# Patient Record
Sex: FEMALE | Race: WHITE
[De-identification: names, ages, dates, MRNs, and addresses within clinical notes are randomized per-mention and may not be internally consistent; named-entity substitution may affect disease eponyms.]

---

## 2018-06-07 ENCOUNTER — HOSPITAL ENCOUNTER (OUTPATIENT)
Dept: HOSPITAL 52 - LL.SDS | Age: 68
Discharge: HOME | End: 2018-06-07
Attending: SURGERY
Payer: MEDICARE

## 2018-06-07 VITALS — DIASTOLIC BLOOD PRESSURE: 50 MMHG | SYSTOLIC BLOOD PRESSURE: 120 MMHG

## 2018-06-07 DIAGNOSIS — E66.9: ICD-10-CM

## 2018-06-07 DIAGNOSIS — D64.9: Primary | ICD-10-CM

## 2018-06-07 DIAGNOSIS — R19.4: ICD-10-CM

## 2018-06-07 DIAGNOSIS — R10.30: ICD-10-CM

## 2018-06-07 DIAGNOSIS — I10: ICD-10-CM

## 2018-06-07 DIAGNOSIS — Z79.4: ICD-10-CM

## 2018-06-07 DIAGNOSIS — K21.9: ICD-10-CM

## 2018-06-07 DIAGNOSIS — K57.30: ICD-10-CM

## 2018-06-07 DIAGNOSIS — K63.89: ICD-10-CM

## 2018-06-07 DIAGNOSIS — Z79.899: ICD-10-CM

## 2018-06-07 DIAGNOSIS — E78.5: ICD-10-CM

## 2018-06-07 DIAGNOSIS — E11.42: ICD-10-CM

## 2018-06-07 NOTE — PCM.OPNOTE
- General Post-Op/Procedure Note


Date of Surgery/Procedure: 06/07/18


Operative Procedure(s): colonoscopy with biopsy


Findings: 





extensive sigmoid diverticulosis without acute inflammation


Otherwise normal appearing colon


Pre Op Diagnosis: anemia.  Change in bowel habits


Post-Op Diagnosis: Diverticulosis


Anesthesia Technique: MAC


Primary Surgeon: Matt London


Pathology: 





biopsies of right and left colon


Output, Urine Amount: 0


EBL in mLs: 3


Complications: None


Condition: Good


Free Text/Narrative:: 


 Intake & Output











 06/07/18 06/07/18 06/07/18





 06:59 14:59 22:59


 


Intake Total  900 


 


Balance  900

## 2018-06-07 NOTE — PCM.PN
- General Info


Date of Service: 06/07/18





- Review of Systems


Systems Review Comment:: 





68-year-old female referred for colonoscopy. She has unexplained anemia. She is 

also had recent upper endoscopy which did not reveal a source of bleeding. She 

has noted a recent change in bowel pattern with more loose stools and lower 

abdominal pain which worsens throughout the day.Her past medical history is 

significant for breast cancer. She is medically stable to proceed today. I have 

discussed the proposed colonoscopy with the patient. Risks such as but not 

limited to bleeding and GI injury reviewed. She appears to understand and 

agrees to proceed.





- Patient Data


Vitals - Most Recent: 


 Last Vital Signs











Temp  98.1 F   06/07/18 13:25


 


Pulse  80   06/07/18 13:25


 


Resp  16   06/07/18 13:25


 


BP  136/64   06/07/18 13:25


 


Pulse Ox  99   06/07/18 13:25











Weight - Most Recent: 94.347 kg


Med Orders - Current: 


 Current Medications





Lactated Ringer's (Ringers, Lactated)  1,000 mls @ 125 mls/hr IV ASDIRECTED RUPINDER


   Last Admin: 06/07/18 13:48 Dose:  125 mls/hr


Sodium Chloride (Saline Flush)  10 ml FLUSH ASDIRECTED PRN


   PRN Reason: Keep Vein Open





Discontinued Medications





Fentanyl (Sublimaze) Confirm Administered Dose 100 mcg .ROUTE .STK-MED ONE


   Stop: 06/07/18 14:15


Midazolam HCl (Versed 1 Mg/Ml) Confirm Administered Dose 2 mg .ROUTE .STK-MED 

ONE


   Stop: 06/07/18 14:15


Propofol (Diprivan  20 Ml) Confirm Administered Dose 200 mg .ROUTE .STK-MED ONE


   Stop: 06/07/18 14:15











- Problem List Review


Problem List Initiated/Reviewed/Updated: Yes





- Assessment


Assessment:: 





anemia


Change in bowel habits





- Plan


Plan:: 





colonoscopy

## 2018-06-08 NOTE — OR
Date of Procedure:  06/07/2018

 

PREOPERATIVE DIAGNOSIS:  Anemia.

 

POSTOPERATIVE DIAGNOSIS:  Diverticulosis.

 

OPERATIONS PERFORMED:  Colonoscopy with biopsy.

 

INDICATIONS FOR SURGERY:  This 68-year-old female was noted to have unexplained

anemia with recent upper endoscopy which did not show bleeding site.  She has

also had some lower abdominal pain.  The patient was referred for a diagnostic

colonoscopy.

 

FINDINGS:  No obvious source of chronic bleeding was seen during today's exam.

The patient had extensive sigmoid diverticulosis with some tortuosity of the

sigmoid colon; however, this does not appear to be acutely inflamed at this

time.  The remainder of the colon mucosa appeared normal.  There were no visible

signs of inflammation, ulceration, or evidence of bleeding at this time.

 

DESCRIPTION OF PROCEDURE:  The patient was taken to the operating room.  She was

given intravenous sedation, and with her in the left lateral decubitus position,

digital rectal exam was performed showing no rectal masses.  The Olympus

colonoscope was inserted into the rectum.  Retroflexed examination of the rectal

canal was performed.  The scope was then carefully advanced under direct

visualization through the entire length of the colon until the cecum was

reached.  It was somewhat difficult through the sigmoid region, but eventually,

the colon was able to be traversed and the cecum reached.  Cecal acquisition was

confirmed by noting the normal internal cecal anatomy including the appendiceal

orifice and ileocecal valve.  After examining the cecum, the scope was slowly

withdrawn sequentially re-examining the colonic segments.  During withdrawal of

the scope, random biopsies were taken from the left and right sides of the colon

to evaluate the patient's diarrhea symptoms.  After the colon had been

completely examined and with no sign of any complication, the scope was removed

and the patient was taken from the operating room in satisfactory condition.

 

ESTIMATED BLOOD LOSS:  3 mL.

 

COMPLICATIONS:  None.

 

PROGNOSIS:  Good.

 

COREY London MD

DD:  06/07/2018 15:57:57

DT:  06/07/2018 18:19:41

Job #:  [id number]

## 2019-09-18 ENCOUNTER — HOSPITAL ENCOUNTER (OUTPATIENT)
Dept: HOSPITAL 7 - FB.SDS | Age: 69
Discharge: HOME | End: 2019-09-18
Attending: NURSE ANESTHETIST, CERTIFIED REGISTERED
Payer: MEDICARE

## 2019-09-18 DIAGNOSIS — G57.12: Primary | ICD-10-CM

## 2019-09-18 DIAGNOSIS — Z79.899: ICD-10-CM

## 2019-09-18 DIAGNOSIS — Z79.4: ICD-10-CM

## 2019-09-18 PROCEDURE — 82962 GLUCOSE BLOOD TEST: CPT

## 2019-09-18 PROCEDURE — 64450 NJX AA&/STRD OTHER PN/BRANCH: CPT

## 2019-09-18 NOTE — US
INDICATION:  Left lateral femoral cutaneous nerve block.



ULTRASOUND FOR RFA GUIDANCE:  Multiple ultrasonic images were obtained for 
guidance for RFA.  A needle was noted at the left lateral cutaneous femoral 
nerve.  

MTDD

## 2019-09-18 NOTE — PCM.SN
- Free Text/Narrative


Note: 





ANESTHESIA CHRONIC PAIN SERVICE








Date: 09/18/2019


Time: 1102 to 1142








Preprocedure Dx: Meralgia Paresthetica Lt Lateral Upper Thigh [G57.12]


Postprocedure Rx: Left Lateral Cutaneous Femoral Nerve Block.





Procedure: Left Lateral Cutaneous Femoral Nerve [Lcfn] Block with Ultrasound [U/

S] Guidance





This patient was referred to anesthesia by Dr. STEFANIA Edwards D.OGrover for left Lcfn 

Block for left lateral hip pain.  She is s/p lower lumbar laminectomy with 

chronic left hip and knee pain. She is Diabetic Type 2 with insulin.  Her Accu 

check today was under 150.  Upon asking her her pain today, she rated it a 8-9 

/ 10.  I really tried to localize the pain.  It does not radiate down her left 

upper lateral thigh but hurts around her lateral Iliac Crest area.  Little or 

no pain radiating into her lower left buttocks.  It was really hard to pin down 

her site pain.





I discussed her pain with her and informed her that Dr. STEFANIA Edwards would like us 

to try this nerve block.  I told her it was not a true "classic" presentation 

but we would do this but to be aware of no relief.  She wishes to proceed and a 

consent was obtained.  I also told her to watch her blood sugars and to contact 

her PCP if she had problems after 2 days.





Monitors: SpO2 with heart rate [please see nursing notes for the vital signs]. 

    Sedation: None.     She remained awake throughout this procedure without 

complaints.





She is supine with her H.O.B. elevated 30 degrees.  A Preprocedure U/S scan was 

done locating the Left Femoral Artery and Nerve following laterally locating 

the Left Sartorious Muscle with continuing laterally locating what appeared to 

be the Left Lcfn.  A wide prep area was done with a Chlora-Prep sponge and 

allowed to dry.  Using aseptic technique, I infiltrated the block needle 

insertion site with 3 ml's of 1% Lidocaine Plain using a 30 Ga. needle.  Under 

direct U/S visualization, I inserted and advanced a 20Ga. 4 In. Stimuplex Ultra 

360 Echogenic Needle to what appeared to be a good spot with mild difficulty [

huge panniculus].  Upon a 1 ml injection, it did not appear to be where I 

wanted it.  I then stopped and rescanned for the Lcfn which was done with 

moderate difficulty.  It was easy to follow the Sartorious until the end of the 

muscle which finding the Lcfn was problematic.





After what appeared to be the left Lcfn, under direct U/S visualization, a 

total of 8 ml's of .5% Naropin plain and 2 ml's of Dexamethasone was injected 

slowly with frequent aspirations lifting the space and nerve.  She tolerated 

this procedure quite well with no complaints or complications noted.





After about 10 minutes or more, she was discharged home walking with a pain 

scale of 4 / 10.  I discussed with her again if she would like another 

injection after 1 week we could do it.  She appeared very happy and laughing 

while walking.





Documentation: Please see the images taken in Radiology PAC system.





Thank you for using our service.





JALIL Michaels CRNA

## 2019-11-14 ENCOUNTER — HOSPITAL ENCOUNTER (OUTPATIENT)
Dept: HOSPITAL 52 - LL.SDS | Age: 69
Discharge: HOME | End: 2019-11-14
Attending: SURGERY
Payer: MEDICARE

## 2019-11-14 VITALS — HEART RATE: 78 BPM | DIASTOLIC BLOOD PRESSURE: 66 MMHG | SYSTOLIC BLOOD PRESSURE: 123 MMHG

## 2019-11-14 DIAGNOSIS — Z79.82: ICD-10-CM

## 2019-11-14 DIAGNOSIS — E11.9: ICD-10-CM

## 2019-11-14 DIAGNOSIS — I10: ICD-10-CM

## 2019-11-14 DIAGNOSIS — Z79.899: ICD-10-CM

## 2019-11-14 DIAGNOSIS — G56.02: Primary | ICD-10-CM

## 2019-11-14 DIAGNOSIS — Z79.4: ICD-10-CM

## 2019-11-14 PROCEDURE — 01830 ANES ARTHR/NDSC WRST/HND NOS: CPT

## 2019-11-14 PROCEDURE — 64721 CARPAL TUNNEL SURGERY: CPT

## 2019-11-14 PROCEDURE — 82962 GLUCOSE BLOOD TEST: CPT

## 2019-11-14 NOTE — PCM.HPR
H & P Addendum review





- H & P Addendum Review


Date of Original H & P: 10/31/19


Date Reviewed: 11/14/19


Time Reviewed: 08:59


Patient was Examined: No Changes

## 2019-11-14 NOTE — PCM.OPNOTE
- General Post-Op/Procedure Note


Date of Surgery/Procedure: 11/14/19


Operative Procedure(s): L CTR


Pre Op Diagnosis: L CTS


Post-Op Diagnosis: Same


Anesthesia Technique: Local, MAC


Primary Surgeon: Thomas J Mohs


Anesthesia Provider: Radha Fall


Complications: None


Condition: Good

## 2019-11-14 NOTE — OR
Date of Procedure:  11/14/2019

 

PREOPERATIVE DIAGNOSIS:  Left carpal tunnel syndrome.

 

POSTOPERATIVE DIAGNOSIS:  Left carpal tunnel syndrome.

 

PROCEDURE:  Left carpal tunnel release.

 

ANESTHESIA:  Local with IV sedation.

 

DESCRIPTION OF PROCEDURE:  The patient was brought to the procedure room where

IV sedation was administered.  Her left upper extremity was exsanguinated and

tourniquet inflated.  Her hand was prepped and draped sterilely.  3 mL of 1%

lidocaine were injected in the palmar crease.  A routine incision was made in

the skin crease over the transverse carpal ligament and extended through the

subcutaneous tissue and palmar aponeurosis.  The transverse carpal ligament was

identified and sharply incised until the median nerve was visible.  The ligament

was then split distally into the palm and then proximally into the wrist.

Finger palpation and inspection revealed the constricting band to be completely

released.  The wound was irrigated and the skin closed with interrupted 4-0

Prolene vertical mattress sutures.  Antibiotic ointment and a sterile bulky

pressure dressing were applied.  The patient tolerated the procedure well and

returned to Recovery in stable condition.

 

BLOOD LOSS:  None.

 

MODL THOMAS J MOHS, MD

DD:  11/14/2019 09:43:28

DT:  11/14/2019 10:10:56

Job #:  [id number]

## 2020-01-09 ENCOUNTER — HOSPITAL ENCOUNTER (OUTPATIENT)
Dept: HOSPITAL 52 - LL.SDS | Age: 70
Discharge: HOME | End: 2020-01-09
Attending: SURGERY
Payer: MEDICARE

## 2020-01-09 VITALS — HEART RATE: 82 BPM | SYSTOLIC BLOOD PRESSURE: 144 MMHG | DIASTOLIC BLOOD PRESSURE: 79 MMHG

## 2020-01-09 DIAGNOSIS — Z79.82: ICD-10-CM

## 2020-01-09 DIAGNOSIS — L02.31: ICD-10-CM

## 2020-01-09 DIAGNOSIS — I10: ICD-10-CM

## 2020-01-09 DIAGNOSIS — G56.01: Primary | ICD-10-CM

## 2020-01-09 DIAGNOSIS — E66.9: ICD-10-CM

## 2020-01-09 DIAGNOSIS — Z79.4: ICD-10-CM

## 2020-01-09 DIAGNOSIS — Z79.899: ICD-10-CM

## 2020-01-09 DIAGNOSIS — E11.9: ICD-10-CM

## 2020-01-09 RX ADMIN — BACITRACIN ZINC ONE DOSE: 500 OINTMENT TOPICAL at 09:33

## 2020-01-09 NOTE — PCM.HPR
H & P Addendum review





- H & P Addendum Review


Date of Original H & P: 12/31/19


Date Reviewed: 01/09/20


Time Reviewed: 09:05


Patient was Examined: No Changes

## 2020-01-09 NOTE — PCM.OPNOTE
- General Post-Op/Procedure Note


Date of Surgery/Procedure: 01/09/20


Operative Procedure(s): R CTR


Pre Op Diagnosis: R CTS


Post-Op Diagnosis: Same


Anesthesia Technique: Local, MAC


Primary Surgeon: Thomas J Mohs EBL in mLs: 0


Complications: None


Condition: Good

## 2020-01-09 NOTE — OR
Date of Procedure:  01/09/2020

 

PREOPERATIVE DIAGNOSIS:  Right carpal tunnel syndrome.

 

POSTOPERATIVE DIAGNOSIS:  Right carpal tunnel syndrome.

 

PROCEDURE:  Right carpal tunnel release.

 

ANESTHESIA:  Local MAC.

 

DESCRIPTION OF PROCEDURE:  The patient was brought to the procedure room where

she was placed in the supine position and IV sedation administered.  Right hand

and forearm were exsanguinated and tourniquet inflated.  Hand and forearm were

prepped with ChloraPrep and draped sterilely.  2 mL of 1% lidocaine was

infiltrated in the palmar crease.  An incision was made in the crease over the

transverse carpal ligament and extended through the subcutaneous tissue and

palmar aponeurosis until the transverse carpal ligament was identified.  This

was sharply incised until the median nerve was visible.  The ligament was split

distally into the palm and then proximally into the wrist.  Finger palpation and

inspection revealed all constricting bands to be released.  The wound was closed

with interrupted 4-0 Prolene vertical mattress sutures.  Antibiotic ointment and

a bulky sterile pressure dressing were applied.  The patient tolerated the

procedure well and returned to Recovery in stable condition.

 

MODL THOMAS J MOHS, MD

DD:  01/09/2020 09:46:17

DT:  01/09/2020 11:45:09

Job #:  [id number]

## 2021-05-25 ENCOUNTER — HOSPITAL ENCOUNTER (EMERGENCY)
Dept: HOSPITAL 52 - LL.ED | Age: 71
Discharge: HOME | End: 2021-05-25
Payer: MEDICARE

## 2021-05-25 VITALS — DIASTOLIC BLOOD PRESSURE: 78 MMHG | SYSTOLIC BLOOD PRESSURE: 159 MMHG | HEART RATE: 75 BPM

## 2021-05-25 DIAGNOSIS — S01.81XA: Primary | ICD-10-CM

## 2021-05-25 DIAGNOSIS — T81.30XA: ICD-10-CM
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
## 2021-05-25 DIAGNOSIS — E11.9: ICD-10-CM

## 2021-05-25 DIAGNOSIS — K21.9: ICD-10-CM

## 2021-05-25 DIAGNOSIS — E78.00: ICD-10-CM

## 2021-05-25 DIAGNOSIS — Z79.82: ICD-10-CM

## 2021-05-25 DIAGNOSIS — E66.9: ICD-10-CM

## 2021-05-25 DIAGNOSIS — Z79.4: ICD-10-CM

## 2021-05-25 DIAGNOSIS — Z79.899: ICD-10-CM

## 2021-05-25 DIAGNOSIS — I10: ICD-10-CM
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
## 2021-05-25 NOTE — EDM.PDOC
ED HPI GENERAL MEDICAL PROBLEM





- General


Chief Complaint: Laceration


Stated Complaint: Fall at home, Surgical Incision reopened


Time Seen by Provider: 05/25/21 09:01


Source of Information: Reports: Patient


History Limitations: Reports: No Limitations





- History of Present Illness


INITIAL COMMENTS - FREE TEXT/NARRATIVE: 





Pt. presents to ER with complaints of dehiscence of surgical incision of L knee 

and facial laceration post fall. Pt. states that she fell at home today, which 

caused the incision from a recent knee replacement to open. She also stuck her 

face. causing a laceration to her chin. Pt. states that she did not feel dizzy 

or lightheaded prior to the fall. Denies any chest pain, shortness of breath, 

headache.


Pt. tetanus is up to date.


Pt. states that she is not experiencing any johanna or deep pain in he knee, and 

she states that she is able to bear weight. She only complains of superficial 

pain associated with the soft tissue injury.


Onset: Today


Location: Reports: Face, Lower Extremity, Left


Associated Symptoms: Denies: Confusion, Chest Pain, Cough, Diaphoresis, 

Fever/Chills, Headaches, Malaise, Nausea/Vomiting, Seizure, Shortness of Breath,

Syncope, Weakness





- Related Data


                                    Allergies











Allergy/AdvReac Type Severity Reaction Status Date / Time


 


No Known Allergies Allergy   Verified 05/25/21 09:26











Home Meds: 


                                    Home Meds





Lisinopril 2.5 mg PO BEDTIME 11/28/16 [History]


Zolpidem Tartrate [Ambien Cr] 12.5 mg PO BEDTIME 11/28/16 [History]


DULoxetine [Cymbalta] 60 mg PO DAILY 11/29/16 [History]


Insulin Aspart [Novolog Flexpen] 40 unit SQ QAM 11/29/16 [History]


atorvaSTATin [Lipitor] 20 mg PO BEDTIME 11/29/16 [History]


Omeprazole 40 mg PO BEDTIME 06/01/18 [History]


Anastrozole [Arimidex] 1 mg PO BEDTIME 06/07/18 [History]


Aspirin [Children's Aspirin] 81 mg PO BEDTIME 06/07/18 [History]


Acetaminophen [Tylenol] 650 mg PO Q4H PRN 11/13/19 [History]


Docusate Sodium [Colace] 100 mg PO BEDTIME 11/13/19 [History]


Dulaglutide [Trulicity] 1.5 mg SQ Q7D 11/13/19 [History]


Insulin Degludec [Tresiba] 84 unit SQ BEDTIME 11/13/19 [History]


Levothyroxine [Synthroid] 50 mcg PO ACBREAKFAST 11/13/19 [History]


polyethylene glycoL 3350 [MiraLAX] 17 gm PO BEDTIME PRN 11/13/19 [History]


Insulin Aspart [NovoLOG] 20 units SUBCUT DAILY@1800 11/14/19 [History]


sitaGLIPtin Phos/Metformin HCl [Janumet 50-1,000 MG] 1 tab PO DAILY 01/09/20 

[History]


traMADol [Ultram] 50 mg PO TID PRN 01/09/20 [History]


Insulin Aspart [NovoLOG] 30 units SUBCUT DAILY@1200 05/25/21 [History]


Pregabalin [Lyrica] 200 mg PO TID 05/25/21 [History]











Past Medical History


HEENT History: Reports: Other (See Below)


Other HEENT History: wears reading glasses


Cardiovascular History: Reports: High Cholesterol, Hypertension


Respiratory History: Reports: Sleep Apnea


Gastrointestinal History: Reports: Chronic Constipation, GERD


Genitourinary History: Reports: None


OB/GYN History: Reports: Other (See Below)


Other OB/GYN History: menopause


Musculoskeletal History: Reports: Back Pain, Chronic


Other Musculoskeletal History: Chronic hip and knee pain


Neurological History: Reports: Neuropathy, Diabetic


Psychiatric History: Reports: Other (See Below)


Other Psychiatric History: insomnia


Endocrine/Metabolic History: Reports: Diabetes, Type II, Obesity/BMI 30+


Hematologic History: Reports: None


Immunologic History: Reports: None


Oncologic (Cancer) History: Reports: Breast


Other Oncologic History: left breast ductal carcinoma, radiation therapy


Dermatologic History: Reports: None


Other Dermatologic History: pt reports having red spots that covered her whole 

body for months, unable to diagnosis by dermatololgist





- Past Surgical History


GI Surgical History: Reports: Colonoscopy


Female  Surgical History: Reports: Breast Biopsy, Tubal Ligation





Social & Family History





- Caffeine Use


Caffeine Use: Reports: None





ED ROS GENERAL





- Review of Systems


Review Of Systems: See Below


Constitutional: Reports: No Symptoms


HEENT: Reports: Other (chin laceration)


Respiratory: Reports: No Symptoms


Cardiovascular: Reports: No Symptoms


Endocrine: Reports: No Symptoms


GI/Abdominal: Reports: No Symptoms


: Reports: No Symptoms


Musculoskeletal: Reports: Other (see HPI)


Skin: Reports: Other (See HPI)


Neurological: Reports: No Symptoms


Psychiatric: Reports: No Symptoms


Hematologic/Lymphatic: Reports: No Symptoms


Immunologic: Reports: No Symptoms





ED EXAM, SKIN/RASH


Exam: See Below


Exam Limited By: No Limitations


General Appearance: Alert, WD/WN, No Apparent Distress


Eye Exam: Bilateral Eye: EOMI, PERRL


Throat/Mouth: Normal Inspection, Normal Lips, Normal Teeth, Normal Gums, Normal 

Oropharynx, Normal Voice, No Airway Compromise


Head: Other (Pt. has a 2 cm laceration to bottom of chin. No obvious trauma to 

underlying structures. No malocclusion or crepitus noted. )


Neck: Normal Inspection, Supple, Non-Tender, Full Range of Motion


Respiratory/Chest: No Respiratory Distress, Lungs Clear


Cardiovascular: Normal Peripheral Pulses, Regular Rate, Rhythm, No Edema, No 

JVD, No Murmur


Peripheral Pulses: 4+: Radial (R)


GI/Abdominal: Soft, Non-Tender, No Distention, No Mass


 (Female) Exam: Deferred


Rectal (Female) Exam: Deferred


Back Exam: Normal Inspection, Full Range of Motion


Extremities: Other (Traumatic dehiescense of surgical incision on left anterior 

knee. No obvious underlying johanna deformity. CMS intact. Pt. is able to weight 

bear.)


Neurological: Alert, Oriented, CN II-XII Intact, Normal Cognition, Normal Gait, 

Normal Reflexes, No Motor/Sensory Deficits


Psychiatric: Normal Affect, Normal Mood


Skin: Warm, Dry, Intact, Normal Color, No Rash





ED SKIN PROCEDURES





- Laceration/Wound Repair


  ** Face


Appearance: Subcutaneous


Anesthetic Type: Local


Local Anesthesia - Lidocaine (Xylocaine): 1% Plain


Local Anesthetic Volume: 3cc


Skin Prep: Providone-Iodine (Betadine), Saline, Sterile Drape


Exploration/Debridement/Repair: Wound Explored, Explored to Base


Closed with: Sutures


Lac/Wound length In cm: 2


Suture Size: 5-0


# of Sutures: 2





Course





- Vital Signs


Last Recorded V/S: 


                                Last Vital Signs











Temp  37.2 C   05/25/21 09:20


 


Pulse  75   05/25/21 09:20


 


Resp  18   05/25/21 09:20


 


BP  159/78 H  05/25/21 09:20


 


Pulse Ox  95   05/25/21 09:20














- Orders/Labs/Meds


Orders: 


                               Active Orders 24 hr











 Category Date Time Status


 


 Peripheral IV Care [RC] .AS DIRECTED Care  05/25/21 10:18 Active


 


 Head wo Cont [CT] Stat Exams  05/25/21 09:09 Taken


 


 Sodium Chloride 0.9% [Saline Flush] Med  05/25/21 10:18 Active





 10 ml FLUSH ASDIRECTED PRN   


 


 Peripheral IV Insertion Adult [OM.PC] Routine Oth  05/25/21 10:18 Ordered








                                Medication Orders





Sodium Chloride (Sodium Chloride 0.9% 10 Ml Syringe)  10 ml FLUSH ASDIRECTED PRN


   PRN Reason: Keep Vein Open








Meds: 


Medications











Generic Name Dose Route Start Last Admin





  Trade Name Freq  PRN Reason Stop Dose Admin


 


Sodium Chloride  10 ml  05/25/21 10:18 





  Sodium Chloride 0.9% 10 Ml Syringe  FLUSH  





  ASDIRECTED PRN  





  Keep Vein Open  














Discontinued Medications














Generic Name Dose Route Start Last Admin





  Trade Name Freq  PRN Reason Stop Dose Admin


 


Cefazolin Sodium 2 gm/ Sodium  100 mls @ 200 mls/hr  05/25/21 10:19  05/25/21 

10:45





  Chloride  IV  05/25/21 10:48  Not Given





  ONETIME ONE  


 


Cefazolin Sodium/Dextrose  50 mls @ 100 mls/hr  05/25/21 10:35  05/25/21 10:44





  Ancef 2 Gm/50 Ml  IV  05/25/21 11:04  100 mls/hr





  ONETIME ONE   Administration


 


Lidocaine HCl  5 ml  05/25/21 09:08  05/25/21 09:21





  Lidocaine 1% 5 Ml Sdv  INJECT  05/25/21 09:09  5 ml





  ONETIME ONE   Administration














Departure





- Departure


Time of Disposition: 12:00


Disposition: Home, Self-Care 01


Clinical Impression: 


 Dehiscence of external surgical wound, Facial laceration








- Discharge Information


Instructions:  Laceration Care, Adult


Referrals: 


Venessa Boucher PA [Primary Care Provider] - 


Forms:  ED Department Discharge


Additional Instructions: 


Keep dressing that was placed in ER on until you are seen at [facility].





Keflex 500mg


1 tab 4 times a day for 7 days. You can take your first dose in 8 hours.





Tramadol 50mg


1 tab every 4-6 hours as needed for pain





[facility] will be in contact with you regarding surgery tomorrow. No 

eating after midnight.





Sutures out of chin in 12 days. Keep dry for 24 hours.





Return if you notice any redness, swelling, or discharge from the area.





Your CT scan was negative. It was transmitted to [facility] in [place], however.





Please call if you have any questions.








Sepsis Event Note (ED)





- Focused Exam


Vital Signs: 


                                   Vital Signs











  Temp Pulse Resp BP Pulse Ox


 


 05/25/21 09:20  37.2 C  75  18  159/78 H  95














- Problem List Review


Problem List Initiated/Reviewed/Updated: Yes





- My Orders


Last 24 Hours: 


My Active Orders





05/25/21 09:09


Head wo Cont [CT] Stat 





05/25/21 10:18


Peripheral IV Care [RC] .AS DIRECTED 


Sodium Chloride 0.9% [Saline Flush]   10 ml FLUSH ASDIRECTED PRN 


Peripheral IV Insertion Adult [OM.PC] Routine 














- Assessment/Plan


Last 24 Hours: 


My Active Orders





05/25/21 09:09


Head wo Cont [CT] Stat 





05/25/21 10:18


Peripheral IV Care [RC] .AS DIRECTED 


Sodium Chloride 0.9% [Saline Flush]   10 ml FLUSH ASDIRECTED PRN 


Peripheral IV Insertion Adult [OM.PC] Routine 











Plan: 





Keep dressing that was placed in ER on until you are seen at [facility].





Keflex 500mg


1 tab 4 times a day for 7 days. You can take your first dose in 8 hours.





Tramadol 50mg


1 tab every 4-6 hours as needed for pain





[facility] will be in contact with you regarding surgery tomorrow. No 

eating after midnight.





Sutures out of chin in 12 days. Keep dry for 24 hours.





Return if you notice any redness, swelling, or discharge from the area.





Your CT scan was negative. It was transmitted to [facility] in [place], however.





Please call if you have any questions.

## 2022-01-14 ENCOUNTER — HOSPITAL ENCOUNTER (OUTPATIENT)
Dept: HOSPITAL 52 - LL.ED | Age: 72
Setting detail: OBSERVATION
LOS: 2 days | Discharge: HOME | End: 2022-01-16
Payer: COMMERCIAL

## 2022-01-14 DIAGNOSIS — Z96.642: ICD-10-CM

## 2022-01-14 DIAGNOSIS — E66.9: ICD-10-CM

## 2022-01-14 DIAGNOSIS — Z79.899: ICD-10-CM

## 2022-01-14 DIAGNOSIS — E11.9: ICD-10-CM

## 2022-01-14 DIAGNOSIS — E78.00: ICD-10-CM

## 2022-01-14 DIAGNOSIS — Z98.890: ICD-10-CM

## 2022-01-14 DIAGNOSIS — R53.1: ICD-10-CM

## 2022-01-14 DIAGNOSIS — R41.0: Primary | ICD-10-CM

## 2022-01-14 DIAGNOSIS — R29.6: ICD-10-CM

## 2022-01-14 DIAGNOSIS — G47.30: ICD-10-CM

## 2022-01-14 DIAGNOSIS — I10: ICD-10-CM

## 2022-01-14 DIAGNOSIS — Z79.4: ICD-10-CM

## 2022-01-14 LAB
ANION GAP SERPL CALC-SCNC: 8.7 MEQ/L (ref 7–15)
CHLORIDE SERPL-SCNC: 103 MMOL/L (ref 98–107)
SODIUM SERPL-SCNC: 139 MMOL/L (ref 136–145)

## 2022-01-14 PROCEDURE — G0378 HOSPITAL OBSERVATION PER HR: HCPCS

## 2022-01-15 RX ADMIN — INSULIN HUMAN SCH UNIT: 100 INJECTION, SOLUTION PARENTERAL at 09:56

## 2022-01-15 RX ADMIN — OXYBUTYNIN CHLORIDE SCH MG: 5 TABLET, FILM COATED, EXTENDED RELEASE ORAL at 09:49

## 2022-01-15 RX ADMIN — INSULIN HUMAN SCH UNIT: 100 INJECTION, SOLUTION PARENTERAL at 17:32

## 2022-01-15 RX ADMIN — VITAMIN E CAP 400 UNIT SCH UNITS: 400 CAP at 09:49

## 2022-01-15 RX ADMIN — INSULIN HUMAN SCH UNIT: 100 INJECTION, SOLUTION PARENTERAL at 12:07

## 2022-01-15 RX ADMIN — ASPIRIN SCH MG: 325 TABLET, DELAYED RELEASE ORAL at 09:49

## 2022-01-15 RX ADMIN — CYANOCOBALAMIN TAB 250 MCG SCH MCG: 250 TAB at 09:49

## 2022-01-15 RX ADMIN — OXYBUTYNIN CHLORIDE SCH MG: 5 TABLET, FILM COATED, EXTENDED RELEASE ORAL at 17:33

## 2022-01-15 RX ADMIN — OMEPRAZOLE SCH MG: 20 CAPSULE, DELAYED RELEASE ORAL at 09:48

## 2022-01-16 VITALS — DIASTOLIC BLOOD PRESSURE: 72 MMHG | SYSTOLIC BLOOD PRESSURE: 130 MMHG

## 2022-01-16 VITALS — HEART RATE: 74 BPM

## 2022-01-16 LAB
ANION GAP SERPL CALC-SCNC: 9.2 MEQ/L (ref 7–15)
CHLORIDE SERPL-SCNC: 103 MMOL/L (ref 98–107)
SODIUM SERPL-SCNC: 141 MMOL/L (ref 136–145)

## 2022-01-16 RX ADMIN — OXYBUTYNIN CHLORIDE SCH MG: 5 TABLET, FILM COATED, EXTENDED RELEASE ORAL at 08:50

## 2022-01-16 RX ADMIN — INSULIN HUMAN SCH UNIT: 100 INJECTION, SOLUTION PARENTERAL at 09:25

## 2022-01-16 RX ADMIN — OMEPRAZOLE SCH MG: 20 CAPSULE, DELAYED RELEASE ORAL at 08:53

## 2022-01-16 RX ADMIN — ASPIRIN SCH MG: 325 TABLET, DELAYED RELEASE ORAL at 08:54

## 2022-01-16 RX ADMIN — CYANOCOBALAMIN TAB 250 MCG SCH MCG: 250 TAB at 09:19

## 2022-01-16 RX ADMIN — VITAMIN E CAP 400 UNIT SCH UNITS: 400 CAP at 08:54

## 2022-01-16 RX ADMIN — INSULIN HUMAN SCH: 100 INJECTION, SOLUTION PARENTERAL at 13:21

## 2024-12-31 ENCOUNTER — HOSPITAL ENCOUNTER (EMERGENCY)
Dept: HOSPITAL 52 - LL.ED | Age: 74
Discharge: NURSING, REHAB OR LONG-TERM CARE | End: 2024-12-31
Payer: MEDICARE

## 2024-12-31 VITALS — SYSTOLIC BLOOD PRESSURE: 96 MMHG | DIASTOLIC BLOOD PRESSURE: 57 MMHG | HEART RATE: 118 BPM

## 2024-12-31 DIAGNOSIS — K21.9: ICD-10-CM

## 2024-12-31 DIAGNOSIS — I26.94: Primary | ICD-10-CM

## 2024-12-31 DIAGNOSIS — E66.9: ICD-10-CM

## 2024-12-31 DIAGNOSIS — Z79.4: ICD-10-CM

## 2024-12-31 DIAGNOSIS — Z79.890: ICD-10-CM

## 2024-12-31 DIAGNOSIS — E78.00: ICD-10-CM

## 2024-12-31 DIAGNOSIS — I10: ICD-10-CM

## 2024-12-31 DIAGNOSIS — Z79.82: ICD-10-CM

## 2024-12-31 DIAGNOSIS — N39.0: ICD-10-CM

## 2024-12-31 DIAGNOSIS — E11.9: ICD-10-CM

## 2024-12-31 DIAGNOSIS — Z79.899: ICD-10-CM

## 2024-12-31 LAB
ALBUMIN SERPL-MCNC: 2.8 G/DL (ref 3.4–5)
ALP SERPL-CCNC: 256 IU/L (ref 46–116)
ALT SERPL-CCNC: 175 U/L (ref 12–78)
ANION GAP SERPL CALC-SCNC: 11.3 MEQ/L (ref 7–15)
APPEARANCE UR: (no result)
AST SERPL-CCNC: 174 U/L (ref 15–37)
BACTERIA URNS QL MICRO: (no result) /HPF
BASOPHILS # BLD AUTO: 0.03 K/UL (ref 0–0.2)
BASOPHILS NFR BLD AUTO: 0.3 % (ref 0–2)
BILIRUB SERPL-MCNC: 4.1 MG/DL (ref 0.2–1)
BILIRUB UR STRIP-MCNC: (no result) MG/DL
BUN SERPL-MCNC: 48 MG/DL (ref 7–18)
CALCIUM SERPL-MCNC: 8.5 MG/DL (ref 8.5–10.1)
CHLORIDE SERPL-SCNC: 104 MMOL/L (ref 98–107)
CO2 SERPL-SCNC: 25.7 MMOL/L (ref 21–32)
COLOR UR: YELLOW
CREAT CL 24H UR+SERPL-VRATE: 15.39 ML/MIN
CREAT CL 24H UR+SERPL-VRATE: 20.73 ML/MIN
CREAT SERPL-MCNC: 2.28 MG/DL (ref 0.51–1.17)
CREAT SERPL-MCNC: 2.53 MG/DL (ref 0.51–1.17)
EGFRCR SERPLBLD CKD-EPI 2021: 19 ML/MIN (ref 60–?)
EGFRCR SERPLBLD CKD-EPI 2021: 22 ML/MIN (ref 60–?)
EOSINOPHIL # BLD AUTO: 0.12 K/UL (ref 0–0.5)
EOSINOPHIL NFR BLD AUTO: 1.2 % (ref 0–5)
EPI CELLS #/AREA URNS HPF: (no result) /LPF
ERYTHROCYTE [DISTWIDTH] IN BLOOD BY AUTOMATED COUNT: 16.4 % (ref 11.2–14.1)
GLUCOSE SERPL-MCNC: 93 MG/DL (ref 70–99)
GLUCOSE UR STRIP-MCNC: NEGATIVE MG/DL
HCT VFR BLD AUTO: 36.1 % (ref 34–46)
HGB BLD-MCNC: 10.8 G/DL (ref 11.7–15.5)
IMMATURE GRAN ABSOLUTE AUTO: 0.04 10^3/UL (ref 0–0.04)
IMMATURE GRAN PERCENT AUTO: 0.4 % (ref 0–0.4)
KETONES UR STRIP-MCNC: NEGATIVE MG/DL
LYMPHOCYTES # BLD AUTO: 0.39 K/UL (ref 0.5–3.5)
LYMPHOCYTES NFR BLD AUTO: 3.9 % (ref 10–50)
MAGNESIUM SERPL-MCNC: 2.8 MG/DL (ref 1.8–2.4)
MCH RBC QN AUTO: 24.6 PG (ref 28.2–33.3)
MCHC RBC AUTO-ENTMCNC: 29.9 G/DL (ref 31.7–36)
MCHC RBC AUTO-ENTMCNC: 82.2 FL (ref 84–98)
MONOCYTES # BLD AUTO: 0.73 K/UL (ref 0–1)
MONOCYTES NFR BLD AUTO: 7.3 % (ref 2–14)
MUCOUS THREADS URNS QL MICRO: (no result) /LPF
NEUTROPHILS # BLD AUTO: 8.69 K/UL (ref 1.4–7)
NEUTROPHILS NFR BLD AUTO: 86.9 % (ref 45–80)
NITRITE UR QL: POSITIVE
PH UR STRIP: 5.5 [PH] (ref 5–9)
PLATELET # BLD AUTO: 241 K/UL (ref 150–350)
POTASSIUM SERPL-SCNC: 3.9 MMOL/L (ref 3.5–5.1)
PROT SERPL-MCNC: 6.6 G/DL (ref 6.4–8.2)
PROT UR STRIP-MCNC: 100 MG/DL
RBC # BLD AUTO: 4.39 M/UL (ref 3.77–5.09)
RBC # URNS HPF: (no result) /HPF
RBC UR QL: (no result)
SODIUM SERPL-SCNC: 141 MMOL/L (ref 136–145)
SP GR UR STRIP: 1.02 (ref 1–1.03)
UROBILINOGEN UR STRIP-ACNC: 1 E.U./DL (ref 0.2–1)
WBC # BLD AUTO: 10 K/UL (ref 4–10.2)
WBC UR QL: (no result) /HPF

## 2024-12-31 RX ADMIN — HEPARIN SODIUM ONE UNITS: 5000 INJECTION, SOLUTION INTRAVENOUS; SUBCUTANEOUS at 18:55

## 2024-12-31 RX ADMIN — HEPARIN SODIUM SCH MLS/HR: 5000 INJECTION, SOLUTION INTRAVENOUS at 18:58

## 2024-12-31 RX ADMIN — Medication PRN ML: at 13:13

## 2025-05-22 ENCOUNTER — HOSPITAL ENCOUNTER (OUTPATIENT)
Dept: HOSPITAL 52 - LL.SDS | Age: 75
Discharge: HOME | End: 2025-05-22
Attending: SURGERY
Payer: MEDICARE

## 2025-05-22 VITALS — DIASTOLIC BLOOD PRESSURE: 61 MMHG | SYSTOLIC BLOOD PRESSURE: 136 MMHG | HEART RATE: 67 BPM

## 2025-05-22 DIAGNOSIS — I85.00: ICD-10-CM

## 2025-05-22 DIAGNOSIS — Z86.0100: ICD-10-CM

## 2025-05-22 DIAGNOSIS — K57.30: ICD-10-CM

## 2025-05-22 DIAGNOSIS — K29.50: ICD-10-CM

## 2025-05-22 DIAGNOSIS — D50.9: Primary | ICD-10-CM
